# Patient Record
Sex: MALE | Race: WHITE | URBAN - METROPOLITAN AREA
[De-identification: names, ages, dates, MRNs, and addresses within clinical notes are randomized per-mention and may not be internally consistent; named-entity substitution may affect disease eponyms.]

---

## 2024-08-13 ENCOUNTER — APPOINTMENT (OUTPATIENT)
Dept: URBAN - METROPOLITAN AREA CLINIC 235 | Age: 64
Setting detail: DERMATOLOGY
End: 2024-08-14

## 2024-08-13 DIAGNOSIS — L21.8 OTHER SEBORRHEIC DERMATITIS: ICD-10-CM

## 2024-08-13 PROBLEM — L73.9 FOLLICULAR DISORDER, UNSPECIFIED: Status: ACTIVE | Noted: 2024-08-13

## 2024-08-13 PROCEDURE — 99203 OFFICE O/P NEW LOW 30 MIN: CPT

## 2024-08-13 PROCEDURE — OTHER COUNSELING: OTHER

## 2024-08-13 PROCEDURE — OTHER PRESCRIPTION: OTHER

## 2024-08-13 PROCEDURE — OTHER PRESCRIPTION MEDICATION MANAGEMENT: OTHER

## 2024-08-13 PROCEDURE — OTHER MIPS QUALITY: OTHER

## 2024-08-13 PROCEDURE — OTHER TREATMENT REGIMEN: OTHER

## 2024-08-13 RX ORDER — CICLOPIROX 10 MG/.96ML
SHAMPOO TOPICAL
Qty: 120 | Refills: 3 | Status: ERX | COMMUNITY
Start: 2024-08-13

## 2024-08-13 RX ORDER — CLOBETASOL PROPIONATE 0.5 MG/ML
SOLUTION TOPICAL
Qty: 50 | Refills: 3 | Status: ERX | COMMUNITY
Start: 2024-08-13

## 2024-08-13 NOTE — PROCEDURE: TREATMENT REGIMEN
Initiate Treatment: clobetasol 0.05 % scalp solution: Apply to scalp once daily at bedtime, rinse off in morning.\\n\\nciclopirox 1 % shampoo: Lather into scalp 3 times a week.
Detail Level: Zone

## 2024-08-13 NOTE — PROCEDURE: PRESCRIPTION MEDICATION MANAGEMENT
Initiate Treatment: Clobetasol 0.05% solution nightly 
Render In Strict Bullet Format?: No
Detail Level: Zone

## 2025-01-02 ENCOUNTER — APPOINTMENT (OUTPATIENT)
Dept: URBAN - METROPOLITAN AREA CLINIC 235 | Age: 65
Setting detail: DERMATOLOGY
End: 2025-01-02

## 2025-01-02 DIAGNOSIS — L71.8 OTHER ROSACEA: ICD-10-CM

## 2025-01-02 DIAGNOSIS — L21.8 OTHER SEBORRHEIC DERMATITIS: ICD-10-CM

## 2025-01-02 PROBLEM — L73.9 FOLLICULAR DISORDER, UNSPECIFIED: Status: ACTIVE | Noted: 2025-01-02

## 2025-01-02 PROCEDURE — OTHER MIPS QUALITY: OTHER

## 2025-01-02 PROCEDURE — OTHER COUNSELING: OTHER

## 2025-01-02 PROCEDURE — 99214 OFFICE O/P EST MOD 30 MIN: CPT

## 2025-01-02 PROCEDURE — OTHER PRESCRIPTION: OTHER

## 2025-01-02 PROCEDURE — OTHER PRESCRIPTION MEDICATION MANAGEMENT: OTHER

## 2025-01-02 RX ORDER — CLINDAMYCIN PHOSPHATE 10 MG/ML
SOLUTION TOPICAL
Qty: 60 | Refills: 0 | Status: ERX | COMMUNITY
Start: 2025-01-02

## 2025-01-02 RX ORDER — METRONIDAZOLE 7.5 MG/G
CREAM TOPICAL
Qty: 45 | Refills: 2 | Status: ERX | COMMUNITY
Start: 2025-01-02

## 2025-01-02 RX ORDER — DOXYCYCLINE HYCLATE 100 MG/1
CAPSULE, GELATIN COATED ORAL
Qty: 30 | Refills: 0 | Status: ERX | COMMUNITY
Start: 2025-01-02

## 2025-01-02 ASSESSMENT — LOCATION ZONE DERM: LOCATION ZONE: FACE

## 2025-01-02 ASSESSMENT — LOCATION SIMPLE DESCRIPTION DERM
LOCATION SIMPLE: RIGHT CHEEK
LOCATION SIMPLE: LEFT CHEEK

## 2025-01-02 ASSESSMENT — LOCATION DETAILED DESCRIPTION DERM
LOCATION DETAILED: LEFT CENTRAL MALAR CHEEK
LOCATION DETAILED: RIGHT MEDIAL MALAR CHEEK

## 2025-01-02 NOTE — PROCEDURE: PRESCRIPTION MEDICATION MANAGEMENT
Render In Strict Bullet Format?: No
Detail Level: Zone
Discontinue Regimen: -Clobetasol 0.05% solution nightly
Initiate Treatment: -clindamycin phosphate 1 % topical solution. Apply to affected areas of scalp one to two times daily as needed for flares
Discontinue Regimen: -clobetasol 0.05 % scalp solution: Apply to scalp once daily at bedtime, rinse off in morning.\\n\\n-ciclopirox 1 % shampoo: Lather into scalp 3 times a week.

## 2025-02-17 ENCOUNTER — APPOINTMENT (OUTPATIENT)
Dept: URBAN - METROPOLITAN AREA CLINIC 235 | Age: 65
Setting detail: DERMATOLOGY
End: 2025-02-17

## 2025-02-17 DIAGNOSIS — L71.8 OTHER ROSACEA: ICD-10-CM

## 2025-02-17 DIAGNOSIS — L21.8 OTHER SEBORRHEIC DERMATITIS: ICD-10-CM

## 2025-02-17 PROBLEM — L73.9 FOLLICULAR DISORDER, UNSPECIFIED: Status: ACTIVE | Noted: 2025-02-17

## 2025-02-17 PROCEDURE — OTHER PRESCRIPTION MEDICATION MANAGEMENT: OTHER

## 2025-02-17 PROCEDURE — OTHER PRESCRIPTION: OTHER

## 2025-02-17 PROCEDURE — 99214 OFFICE O/P EST MOD 30 MIN: CPT

## 2025-02-17 PROCEDURE — OTHER COUNSELING: OTHER

## 2025-02-17 RX ORDER — DOXYCYCLINE HYCLATE 20 MG
TABLET ORAL
Qty: 120 | Refills: 0 | Status: ERX | COMMUNITY
Start: 2025-02-17

## 2025-02-17 RX ORDER — KETOCONAZOLE 20 MG/G
CREAM TOPICAL
Qty: 60 | Refills: 2 | Status: ERX | COMMUNITY
Start: 2025-02-17

## 2025-02-17 RX ORDER — DOXYCYCLINE HYCLATE 100 MG/1
CAPSULE, GELATIN COATED ORAL
Qty: 30 | Refills: 0 | Status: ERX

## 2025-02-17 RX ORDER — DOXYCYCLINE HYCLATE 100 MG/1
CAPSULE, GELATIN COATED ORAL
Qty: 30 | Refills: 0 | Status: CANCELLED

## 2025-02-17 ASSESSMENT — LOCATION DETAILED DESCRIPTION DERM
LOCATION DETAILED: RIGHT MEDIAL MALAR CHEEK
LOCATION DETAILED: LEFT CENTRAL MALAR CHEEK

## 2025-02-17 ASSESSMENT — LOCATION ZONE DERM: LOCATION ZONE: FACE

## 2025-02-17 ASSESSMENT — LOCATION SIMPLE DESCRIPTION DERM
LOCATION SIMPLE: RIGHT CHEEK
LOCATION SIMPLE: LEFT CHEEK

## 2025-02-17 NOTE — PROCEDURE: PRESCRIPTION MEDICATION MANAGEMENT
Detail Level: Zone
Render In Strict Bullet Format?: No
Discontinue Regimen: -clindamycin phosphate 1 % topical solution. Apply to affected areas of scalp one to two times daily as needed for flares
Initiate Treatment: -doxycycline hyclate 20 mg tablet. Take two pills once daily for two months after finishing higher dose
Modify Regimen: -metronidazole 0.75 % topical cream. Apply a thin layer to the entire face nightly
Continue Regimen: -doxycycline hyclate 100 mg capsule. Take one capsule by mouth once daily for 1 month

## 2025-05-19 ENCOUNTER — APPOINTMENT (OUTPATIENT)
Dept: URBAN - METROPOLITAN AREA CLINIC 235 | Age: 65
Setting detail: DERMATOLOGY
End: 2025-05-21

## 2025-05-19 DIAGNOSIS — L70.0 ACNE VULGARIS: ICD-10-CM

## 2025-05-19 DIAGNOSIS — L71.8 OTHER ROSACEA: ICD-10-CM

## 2025-05-19 PROCEDURE — OTHER COUNSELING: OTHER

## 2025-05-19 PROCEDURE — OTHER PRESCRIPTION MEDICATION MANAGEMENT: OTHER

## 2025-05-19 PROCEDURE — OTHER ORDER TESTS: OTHER

## 2025-05-19 PROCEDURE — 99214 OFFICE O/P EST MOD 30 MIN: CPT

## 2025-05-19 PROCEDURE — OTHER ISOTRETINOIN INITIATION: OTHER

## 2025-05-19 ASSESSMENT — LOCATION SIMPLE DESCRIPTION DERM
LOCATION SIMPLE: RIGHT CHEEK
LOCATION SIMPLE: LEFT CHEEK

## 2025-05-19 ASSESSMENT — LOCATION ZONE DERM: LOCATION ZONE: FACE

## 2025-05-19 ASSESSMENT — LOCATION DETAILED DESCRIPTION DERM
LOCATION DETAILED: LEFT CENTRAL MALAR CHEEK
LOCATION DETAILED: LEFT INFERIOR CENTRAL MALAR CHEEK
LOCATION DETAILED: RIGHT INFERIOR CENTRAL MALAR CHEEK
LOCATION DETAILED: RIGHT MEDIAL MALAR CHEEK

## 2025-06-13 ENCOUNTER — RX ONLY (RX ONLY)
Age: 65
End: 2025-06-13

## 2025-06-13 ENCOUNTER — APPOINTMENT (OUTPATIENT)
Dept: URBAN - METROPOLITAN AREA CLINIC 231 | Age: 65
Setting detail: DERMATOLOGY
End: 2025-06-13

## 2025-06-13 DIAGNOSIS — L70.0 ACNE VULGARIS: ICD-10-CM

## 2025-06-13 PROCEDURE — OTHER ORDER TESTS: OTHER

## 2025-06-13 RX ORDER — ISOTRETINOIN 40 MG/1
CAPSULE, LIQUID FILLED ORAL
Qty: 30 | Refills: 0 | Status: ERX | COMMUNITY
Start: 2025-06-13